# Patient Record
Sex: FEMALE
[De-identification: names, ages, dates, MRNs, and addresses within clinical notes are randomized per-mention and may not be internally consistent; named-entity substitution may affect disease eponyms.]

---

## 2021-12-07 PROBLEM — Z00.00 ENCOUNTER FOR PREVENTIVE HEALTH EXAMINATION: Status: ACTIVE | Noted: 2021-12-07

## 2021-12-10 ENCOUNTER — APPOINTMENT (OUTPATIENT)
Dept: OTOLARYNGOLOGY | Facility: CLINIC | Age: 66
End: 2021-12-10
Payer: MEDICARE

## 2021-12-10 VITALS
BODY MASS INDEX: 19.63 KG/M2 | TEMPERATURE: 98 F | DIASTOLIC BLOOD PRESSURE: 76 MMHG | HEART RATE: 64 BPM | SYSTOLIC BLOOD PRESSURE: 113 MMHG | WEIGHT: 100 LBS | HEIGHT: 60 IN

## 2021-12-10 DIAGNOSIS — H90.5 UNSPECIFIED SENSORINEURAL HEARING LOSS: ICD-10-CM

## 2021-12-10 DIAGNOSIS — Z78.9 OTHER SPECIFIED HEALTH STATUS: ICD-10-CM

## 2021-12-10 DIAGNOSIS — J34.2 DEVIATED NASAL SEPTUM: ICD-10-CM

## 2021-12-10 DIAGNOSIS — H90.3 SENSORINEURAL HEARING LOSS, BILATERAL: ICD-10-CM

## 2021-12-10 DIAGNOSIS — H93.13 TINNITUS, BILATERAL: ICD-10-CM

## 2021-12-10 DIAGNOSIS — R09.81 NASAL CONGESTION: ICD-10-CM

## 2021-12-10 PROCEDURE — 92567 TYMPANOMETRY: CPT

## 2021-12-10 PROCEDURE — 31231 NASAL ENDOSCOPY DX: CPT

## 2021-12-10 PROCEDURE — 99204 OFFICE O/P NEW MOD 45 MIN: CPT | Mod: 25

## 2021-12-10 PROCEDURE — 92557 COMPREHENSIVE HEARING TEST: CPT

## 2021-12-10 RX ORDER — FLUTICASONE PROPIONATE 50 UG/1
50 SPRAY, METERED NASAL DAILY
Qty: 1 | Refills: 3 | Status: ACTIVE | COMMUNITY
Start: 2021-12-10 | End: 1900-01-01

## 2021-12-10 NOTE — ASSESSMENT
[FreeTextEntry1] : Patient long history of diminished hearing in her left ear also some congestion in her in her left nasal cavity on examination her ears are normal no wax nasal endoscopy shows a deviated septum to the right I put her on Flonase nasal spray to see if that will help her audiogram showed a asymmetrical sensorineural hearing loss left tremendously worse than right I sent her for an MRI since she does have a fairly long history of tinnitus and this is her first formal hearing test that she has had.  We will review her after the MRI consideration for hearing aid will be considered if she so desires.

## 2021-12-10 NOTE — HISTORY OF PRESENT ILLNESS
[de-identified] : Patient is here for hearing issues bilaterally as well as ringing in the ears. She states that when people speak to her she hears an echo. She thinks that the hearing is worse in the left ear than the right and this has been progressive for about 10 years. THe ringing is louder in the left ear as well but has started in the right ear recently. She does not have any pain in the ears and complains of minor occasional dizziness. She states that if the noise is very loud then she feels dizzy. She had a hearing test in the past, possibly last time was a month ago at the PCP. \par She has nasal congestion in the left nasal cavity all the time. She does not use any medication in the nose

## 2021-12-10 NOTE — REVIEW OF SYSTEMS
[Ear Noises] : ear noises [Nasal Congestion] : nasal congestion [Negative] : Heme/Lymph [Hearing Loss] : hearing loss

## 2021-12-10 NOTE — END OF VISIT
[FreeTextEntry3] : I saw and examined this patient in person. I have discussed with Jolie Nix, Physician Assistant, in detail the above note and agree with the above assessment and plan of care.\par

## 2022-01-09 ENCOUNTER — OUTPATIENT (OUTPATIENT)
Dept: OUTPATIENT SERVICES | Facility: HOSPITAL | Age: 67
LOS: 1 days | End: 2022-01-09
Payer: MEDICARE

## 2022-01-09 ENCOUNTER — APPOINTMENT (OUTPATIENT)
Dept: MRI IMAGING | Facility: IMAGING CENTER | Age: 67
End: 2022-01-09
Payer: MEDICARE

## 2022-01-09 DIAGNOSIS — H90.5 UNSPECIFIED SENSORINEURAL HEARING LOSS: ICD-10-CM

## 2022-01-09 PROCEDURE — 70553 MRI BRAIN STEM W/O & W/DYE: CPT | Mod: 26

## 2022-01-09 PROCEDURE — A9585: CPT

## 2022-01-09 PROCEDURE — 70553 MRI BRAIN STEM W/O & W/DYE: CPT

## 2022-01-10 ENCOUNTER — NON-APPOINTMENT (OUTPATIENT)
Age: 67
End: 2022-01-10

## 2024-02-20 ENCOUNTER — V-VISIT (OUTPATIENT)
Dept: URGENT CARE | Age: 69
End: 2024-02-20

## 2024-02-20 VITALS
WEIGHT: 90 LBS | RESPIRATION RATE: 16 BRPM | HEART RATE: 67 BPM | BODY MASS INDEX: 16.56 KG/M2 | TEMPERATURE: 98.1 F | OXYGEN SATURATION: 96 % | SYSTOLIC BLOOD PRESSURE: 98 MMHG | DIASTOLIC BLOOD PRESSURE: 62 MMHG | HEIGHT: 62 IN

## 2024-02-20 DIAGNOSIS — B00.1 HERPES LABIALIS: Primary | ICD-10-CM

## 2024-02-20 RX ORDER — VALACYCLOVIR HYDROCHLORIDE 1 G/1
TABLET, FILM COATED ORAL
Qty: 4 TABLET | Refills: 1 | Status: SHIPPED | OUTPATIENT
Start: 2024-02-20

## 2024-09-05 ENCOUNTER — APPOINTMENT (OUTPATIENT)
Dept: URGENT CARE | Age: 69
End: 2024-09-05